# Patient Record
Sex: MALE | Race: BLACK OR AFRICAN AMERICAN | Employment: FULL TIME | URBAN - METROPOLITAN AREA
[De-identification: names, ages, dates, MRNs, and addresses within clinical notes are randomized per-mention and may not be internally consistent; named-entity substitution may affect disease eponyms.]

---

## 2024-10-18 ENCOUNTER — HOSPITAL ENCOUNTER (EMERGENCY)
Facility: HOSPITAL | Age: 44
Discharge: HOME | End: 2024-10-18
Attending: EMERGENCY MEDICINE

## 2024-10-18 VITALS
WEIGHT: 200 LBS | DIASTOLIC BLOOD PRESSURE: 118 MMHG | HEART RATE: 101 BPM | TEMPERATURE: 98 F | OXYGEN SATURATION: 96 % | BODY MASS INDEX: 29.62 KG/M2 | SYSTOLIC BLOOD PRESSURE: 148 MMHG | RESPIRATION RATE: 16 BRPM | HEIGHT: 69 IN

## 2024-10-18 DIAGNOSIS — B34.9 VIRAL ILLNESS: Primary | ICD-10-CM

## 2024-10-18 LAB
ATRIAL RATE: 95 BPM
FLUAV + FLUBV RNA SPEC NAA+PROBE: NEGATIVE
FLUAV + FLUBV RNA SPEC NAA+PROBE: NEGATIVE
P-R INTERVAL: 142 MS
Q-T INTERVAL: 372 MS
QRS DURATION: 114 MS
QTC CALCULATION (BEZET): 467 MS
R AXIS: 110 DEGREES
RSV RNA SPEC NAA+PROBE: NEGATIVE
SARS-COV-2 RNA RESP QL NAA+PROBE: NOT DETECTED
T AXIS: 207 DEGREES
VENTRICULAR RATE: 95 BPM

## 2024-10-18 PROCEDURE — 0241U SARS-COV-2/FLU A AND B/RSV BY PCR (GENEXPERT): CPT | Performed by: EMERGENCY MEDICINE

## 2024-10-18 PROCEDURE — 93010 ELECTROCARDIOGRAM REPORT: CPT

## 2024-10-18 PROCEDURE — 93005 ELECTROCARDIOGRAM TRACING: CPT

## 2024-10-18 PROCEDURE — 99283 EMERGENCY DEPT VISIT LOW MDM: CPT

## 2024-10-18 RX ORDER — IBUPROFEN 400 MG/1
400 TABLET, FILM COATED ORAL ONCE
Status: COMPLETED | OUTPATIENT
Start: 2024-10-18 | End: 2024-10-18

## 2024-10-18 RX ORDER — ACETAMINOPHEN 500 MG
1000 TABLET ORAL ONCE
Status: COMPLETED | OUTPATIENT
Start: 2024-10-18 | End: 2024-10-18

## 2024-10-18 RX ORDER — GABAPENTIN 100 MG/1
100 CAPSULE ORAL 3 TIMES DAILY
COMMUNITY

## 2024-10-18 NOTE — ED PROVIDER NOTES
Patient Seen in: [facility] Emergency Department      History   No chief complaint on file.    Stated Complaint: SOB    Subjective:   HPI          Objective:     History reviewed. No pertinent past medical history.           History reviewed. No pertinent surgical history.             No pertinent social history.                Physical Exam     ED Triage Vitals [10/18/24 0637]   BP (!) 147/105   Pulse 99   Resp 20   Temp 97.8 °F (36.6 °C)   Temp src Temporal   SpO2 99 %   O2 Device None (Room air)       Current Vitals:   Vital Signs  BP: (!) 148/118  Pulse: 101  Resp: 16  Temp: 97.8 °F (36.6 °C)  Temp src: Temporal    Oxygen Therapy  SpO2: 96 %  O2 Device: None (Room air)        Physical Exam        ED Course     Labs Reviewed   SARS-COV-2/FLU A AND B/RSV BY PCR (GEN[employer]ERT)     EKG    Rate, intervals and axes as noted on EKG Report.  Rate: 95  Rhythm: Sinus Rhythm  Reading: Normal sinus rhythm without signs of acute ischemia or abnormal intervals.                         MDM      44-year-old male without significant past medical history presents for evaluation of of chills and shortness of breath.  Patient states for the last 2 days, he has been feeling somewhat weak with some on and off chills and mild shortness of breath.  Denies cough, chest pain, leg pain/swelling, GI symptoms, dysuria, belly pain, or other symptoms.  Has not taken medications for the above.    On exam, somewhat hypertensive which the patient states is baseline with otherwise normal vitals, well-appearing, clear lungs, soft abdomen, no extremity edema/swelling,    Differential: Symptoms seem most consistent with a viral illness and the patient is otherwise well-appearing.    Patient advised on symptomatic management at home and given instructions for PMD follow-up as needed with return precautions.        MDM    Disposition and Plan     Clinical Impression:  1. Viral illness         Disposition:  Discharge  10/18/2024  7:41  am    Follow-up:  Charlotte Galeas MD  [address] [place] [id number]  [phone]    Schedule an appointment as soon as possible for a visit in 1 week(s)  As needed          Medications Prescribed:  Current Discharge Medication List              Supplementary Documentation:
